# Patient Record
Sex: MALE | Race: WHITE | Employment: PART TIME | URBAN - METROPOLITAN AREA
[De-identification: names, ages, dates, MRNs, and addresses within clinical notes are randomized per-mention and may not be internally consistent; named-entity substitution may affect disease eponyms.]

---
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
## 2020-06-29 ENCOUNTER — VIRTUAL VISIT (OUTPATIENT)
Dept: FAMILY MEDICINE | Facility: CLINIC | Age: 20
End: 2020-06-29
Payer: COMMERCIAL

## 2020-06-29 DIAGNOSIS — F90.0 ATTENTION DEFICIT HYPERACTIVITY DISORDER (ADHD), PREDOMINANTLY INATTENTIVE TYPE: Primary | ICD-10-CM

## 2020-06-29 PROCEDURE — 99214 OFFICE O/P EST MOD 30 MIN: CPT | Mod: 95 | Performed by: FAMILY MEDICINE

## 2020-06-29 RX ORDER — DEXTROAMPHETAMINE SACCHARATE, AMPHETAMINE ASPARTATE, DEXTROAMPHETAMINE SULFATE AND AMPHETAMINE SULFATE 2.5; 2.5; 2.5; 2.5 MG/1; MG/1; MG/1; MG/1
10 TABLET ORAL 2 TIMES DAILY
Qty: 60 TABLET | Refills: 0 | Status: SHIPPED | OUTPATIENT
Start: 2020-08-30 | End: 2020-06-30

## 2020-06-29 RX ORDER — DEXTROAMPHETAMINE SACCHARATE, AMPHETAMINE ASPARTATE, DEXTROAMPHETAMINE SULFATE AND AMPHETAMINE SULFATE 2.5; 2.5; 2.5; 2.5 MG/1; MG/1; MG/1; MG/1
10 TABLET ORAL 2 TIMES DAILY
Qty: 60 TABLET | Refills: 0 | Status: SHIPPED | OUTPATIENT
Start: 2020-07-30 | End: 2020-06-30

## 2020-06-29 RX ORDER — DEXTROAMPHETAMINE SACCHARATE, AMPHETAMINE ASPARTATE, DEXTROAMPHETAMINE SULFATE AND AMPHETAMINE SULFATE 2.5; 2.5; 2.5; 2.5 MG/1; MG/1; MG/1; MG/1
10 TABLET ORAL 2 TIMES DAILY
Qty: 60 TABLET | Refills: 0 | Status: SHIPPED | OUTPATIENT
Start: 2020-06-29 | End: 2020-06-30

## 2020-06-29 NOTE — PROGRESS NOTES
"Deandre Guerrero is a 19 year old male who is being evaluated via a billable video visit.      The patient has been notified of following:     \"This video visit will be conducted via a call between you and your physician/provider. We have found that certain health care needs can be provided without the need for an in-person physical exam.  This service lets us provide the care you need with a video conversation.  If a prescription is necessary we can send it directly to your pharmacy.  If lab work is needed we can place an order for that and you can then stop by our lab to have the test done at a later time.    Video visits are billed at different rates depending on your insurance coverage.  Please reach out to your insurance provider with any questions.    If during the course of the call the physician/provider feels a video visit is not appropriate, you will not be charged for this service.\"    Patient has given verbal consent for Video visit? Yes  How would you like to obtain your AVS? [facility]  Patient would like the video invitation sent by: Text to cell phone: [phone]  Will anyone else be joining your video visit? No    Subjective     Deandre Guerrero is a 19 year old male who presents today via video visit for the following health issues:    HPI  Medication Followup of Adderall    Taking Medication as prescribed: yes    Side Effects:  None    Medication Helping Symptoms:  yes   History of attention deficit hyperactivity disorder- since childhood.  He is currently working constructions and boss noted him to be lacking in focus and completely one project in hand, and he was encouraged to call and get on adderall.  He reports he was prescribed adderall for many years and that helped a lot with school. 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  Noted that IR was probably better but its been a while.  The extended Release  Caused some decrease in appetite & affected sleep.  He denies any drugs or alcohol " abuse   Denies marijuana use.  Over all in usual  state of good health         Video Start Time: 322om    PROBLEMS TO ADD ON...    BP Readings from Last 3 Encounters:   12/30/18 117/66   06/22/18 123/65 (74 %, Z = 0.65 /  42 %, Z = -0.19)*   06/20/18 122/73 (69 %, Z = 0.51 /  71 %, Z = 0.56)*     *BP percentiles are based on the 2017 AAP Clinical Practice Guideline for boys    Wt Readings from Last 3 Encounters:   12/30/18 59.6 kg (131 lb 8 oz) (20 %, Z= -0.84)*   06/22/18 61.6 kg (135 lb 14.4 oz) (31 %, Z= -0.48)*   06/20/18 59 kg (130 lb) (22 %, Z= -0.79)*     * Growth percentiles are based on [facility] (Boys, 2-20 Years) data.                    Reviewed and updated as needed this visit by Provider  Tobacco  Meds         Review of Systems   Constitutional, HEENT, cardiovascular, pulmonary, GI, , musculoskeletal, neuro, skin, endocrine and psych systems are negative, except as otherwise noted.      Objective             Physical Exam     GENERAL: Healthy, alert and no distress  EYES: Eyes grossly normal to inspection.  No discharge or erythema, or obvious scleral/conjunctival abnormalities.  RESP: No audible wheeze, cough, or visible cyanosis.  No visible retractions or increased work of breathing.    SKIN: Visible skin clear. No significant rash, abnormal pigmentation or lesions.  NEURO: Cranial nerves grossly intact.  Mentation and speech appropriate for age.  PSYCH: Mentation appears normal, affect normal/bright, judgement and insight intact, normal speech and appearance well-groomed.      Diagnostic Test Results:  Labs reviewed in Epic  none         Assessment & Plan   20 yo male-    ICD-10-CM    1. Attention deficit hyperactivity disorder (ADHD), predominantly inattentive type  F90.0 amphetamine-dextroamphetamine (ADDERALL) 10 MG tablet     amphetamine-dextroamphetamine (ADDERALL) 10 MG tablet     amphetamine-dextroamphetamine (ADDERALL) 10 MG tablet     MNPMP- no concerns  Return office visit in 3  months  Earlier if concerns with medications- dose.  Random urine drug screen in future and controlled substance aggreement reviewed on phone but not signed    Potential medication side effects were discussed with the patient; let me know if any occur.      Return in about 3 months (around 9/29/2020) for mood, medications recheck/review/refill.    Eneida Sauceda MD  [facility]      Video-Visit Details    Type of service:  Video Visit    Video End Time:340 pm     Originating Location (pt. Location): Home    Distant Location (provider location):  [facility]     Platform used for Video Visit: Doximity    Return in about 3 months (around 9/29/2020) for mood, medications recheck/review/refill.       Eneida Sauceda MD

## 2020-06-30 ENCOUNTER — TELEPHONE (OUTPATIENT)
Dept: FAMILY MEDICINE | Facility: CLINIC | Age: 20
End: 2020-06-30

## 2020-06-30 DIAGNOSIS — F90.0 ATTENTION DEFICIT HYPERACTIVITY DISORDER (ADHD), PREDOMINANTLY INATTENTIVE TYPE: ICD-10-CM

## 2020-06-30 RX ORDER — DEXTROAMPHETAMINE SACCHARATE, AMPHETAMINE ASPARTATE, DEXTROAMPHETAMINE SULFATE AND AMPHETAMINE SULFATE 2.5; 2.5; 2.5; 2.5 MG/1; MG/1; MG/1; MG/1
10 TABLET ORAL 2 TIMES DAILY
Qty: 60 TABLET | Refills: 0 | Status: SHIPPED | OUTPATIENT
Start: 2020-06-30 | End: 2021-12-13

## 2020-06-30 RX ORDER — DEXTROAMPHETAMINE SACCHARATE, AMPHETAMINE ASPARTATE, DEXTROAMPHETAMINE SULFATE AND AMPHETAMINE SULFATE 2.5; 2.5; 2.5; 2.5 MG/1; MG/1; MG/1; MG/1
10 TABLET ORAL 2 TIMES DAILY
Qty: 60 TABLET | Refills: 0 | Status: SHIPPED | OUTPATIENT
Start: 2020-08-30 | End: 2021-12-13

## 2020-06-30 RX ORDER — DEXTROAMPHETAMINE SACCHARATE, AMPHETAMINE ASPARTATE, DEXTROAMPHETAMINE SULFATE AND AMPHETAMINE SULFATE 2.5; 2.5; 2.5; 2.5 MG/1; MG/1; MG/1; MG/1
10 TABLET ORAL 2 TIMES DAILY
Qty: 60 TABLET | Refills: 0 | Status: SHIPPED | OUTPATIENT
Start: 2020-07-30 | End: 2021-12-13

## 2020-06-30 NOTE — TELEPHONE ENCOUNTER
A.S,   See below  Adderall sent to [facility] yesterday  They don't accept pt's insurance  Pt wants Rx[facility] instead  Pended Rx's if you approve of change  Thanks,  Gretel CAN, RN

## 2020-06-30 NOTE — TELEPHONE ENCOUNTER
Reason for Call:  Medication or medication refill:    Do you use a [facility]?  Name of the pharmacy and phone number for the current request:     [facility] [address] [facility] [address]  Ph. [phone]    Name of the medication requested:   amphetamine-dextroamphetamine (ADDERALL) 10 MG tablet  60 tablet       Other request: Needs to change pharmacy this we initially sent to, as Swapna told him they don't accept his insurance- pt is currently out of this medication    Can we leave a detailed message on this number? YES    Phone number patient can be reached at: Cell number on file:    Telephone Information:   Mobile [phone]       Best Time: any    Call taken on 6/30/2020 at 6:03 PM by Melina Jerez

## 2020-08-25 ENCOUNTER — MYC REFILL (OUTPATIENT)
Dept: FAMILY MEDICINE | Facility: CLINIC | Age: 20
End: 2020-08-25

## 2020-08-25 DIAGNOSIS — F90.0 ATTENTION DEFICIT HYPERACTIVITY DISORDER (ADHD), PREDOMINANTLY INATTENTIVE TYPE: ICD-10-CM

## 2020-08-26 RX ORDER — DEXTROAMPHETAMINE SACCHARATE, AMPHETAMINE ASPARTATE, DEXTROAMPHETAMINE SULFATE AND AMPHETAMINE SULFATE 2.5; 2.5; 2.5; 2.5 MG/1; MG/1; MG/1; MG/1
10 TABLET ORAL 2 TIMES DAILY
Start: 2020-08-26

## 2020-08-26 NOTE — TELEPHONE ENCOUNTER
Adderall:   Duplicate.  Has Rx at pharmacy with start date 8/30/2020.  Patient informed via Raynat  Molly Borrero RN

## 2020-11-16 ENCOUNTER — HEALTH MAINTENANCE LETTER (OUTPATIENT)
Age: 20
End: 2020-11-16

## 2020-11-16 ENCOUNTER — MYC REFILL (OUTPATIENT)
Dept: FAMILY MEDICINE | Facility: CLINIC | Age: 20
End: 2020-11-16

## 2020-11-16 DIAGNOSIS — F90.0 ATTENTION DEFICIT HYPERACTIVITY DISORDER (ADHD), PREDOMINANTLY INATTENTIVE TYPE: ICD-10-CM

## 2020-11-16 RX ORDER — DEXTROAMPHETAMINE SACCHARATE, AMPHETAMINE ASPARTATE, DEXTROAMPHETAMINE SULFATE AND AMPHETAMINE SULFATE 2.5; 2.5; 2.5; 2.5 MG/1; MG/1; MG/1; MG/1
10 TABLET ORAL 2 TIMES DAILY
Qty: 60 TABLET | Refills: 0 | Status: CANCELLED | OUTPATIENT
Start: 2020-11-16

## 2020-12-02 ENCOUNTER — VIRTUAL VISIT (OUTPATIENT)
Dept: FAMILY MEDICINE | Facility: CLINIC | Age: 20
End: 2020-12-02
Payer: COMMERCIAL

## 2020-12-02 DIAGNOSIS — F90.0 ATTENTION DEFICIT HYPERACTIVITY DISORDER (ADHD), PREDOMINANTLY INATTENTIVE TYPE: ICD-10-CM

## 2020-12-02 DIAGNOSIS — F17.200 SMOKER: Primary | ICD-10-CM

## 2020-12-02 PROCEDURE — 99214 OFFICE O/P EST MOD 30 MIN: CPT | Mod: 95 | Performed by: FAMILY MEDICINE

## 2020-12-02 RX ORDER — DEXTROAMPHETAMINE SACCHARATE, AMPHETAMINE ASPARTATE, DEXTROAMPHETAMINE SULFATE AND AMPHETAMINE SULFATE 2.5; 2.5; 2.5; 2.5 MG/1; MG/1; MG/1; MG/1
10 TABLET ORAL 2 TIMES DAILY
Qty: 60 TABLET | Refills: 0 | Status: SHIPPED | OUTPATIENT
Start: 2021-02-02 | End: 2021-03-04

## 2020-12-02 RX ORDER — DEXTROAMPHETAMINE SACCHARATE, AMPHETAMINE ASPARTATE, DEXTROAMPHETAMINE SULFATE AND AMPHETAMINE SULFATE 2.5; 2.5; 2.5; 2.5 MG/1; MG/1; MG/1; MG/1
10 TABLET ORAL 2 TIMES DAILY
Qty: 60 TABLET | Refills: 0 | Status: SHIPPED | OUTPATIENT
Start: 2020-12-02 | End: 2021-01-01

## 2020-12-02 RX ORDER — DEXTROAMPHETAMINE SACCHARATE, AMPHETAMINE ASPARTATE, DEXTROAMPHETAMINE SULFATE AND AMPHETAMINE SULFATE 2.5; 2.5; 2.5; 2.5 MG/1; MG/1; MG/1; MG/1
10 TABLET ORAL 2 TIMES DAILY
Qty: 60 TABLET | Refills: 0 | Status: SHIPPED | OUTPATIENT
Start: 2021-01-02 | End: 2021-02-01

## 2020-12-02 NOTE — PROGRESS NOTES
"Deandre Guerrero is a 20 year old male who is being evaluated via a billable video visit.      The patient has been notified of following:     \"This video visit will be conducted via a call between you and your physician/provider. We have found that certain health care needs can be provided without the need for an in-person physical exam.  This service lets us provide the care you need with a video conversation.  If a prescription is necessary we can send it directly to your pharmacy.  If lab work is needed we can place an order for that and you can then stop by our lab to have the test done at a later time.    Video visits are billed at different rates depending on your insurance coverage.  Please reach out to your insurance provider with any questions.    If during the course of the call the physician/provider feels a video visit is not appropriate, you will not be charged for this service.\"    Patient has given verbal consent for Video visit? Yes  How would you like to obtain your AVS? MyChart  If you are dropped from the video visit, the video invite should be resent to: MyChart  Will anyone else be joining your video visit? No      Subjective     Deandre Guerrero is a 20 year old male who presents today via video visit for the following health issues:    HPI        Medication Followup of adderall     Taking Medication as prescribed: yes    Side Effects:  None    Medication Helping Symptoms:  Yes    Doing online school- just started for [employer]- very excited and enjoying    Study better, work done    Prevents easy distraction, gets work done      ADHD Follow-Up    Date of last ADHD office visit: 6/29/2020  Status since last visit: Improving   Taking controlled (daily) medications as prescribed: Yes                       Parent/Patient Concerns with Medications: None  ADHD Medication     Amphetamines Disp Start End     amphetamine-dextroamphetamine (ADDERALL) 10 MG tablet    60 tablet 6/30/2020     Sig - " "Route: Take 1 tablet (10 mg) by mouth 2 times daily - Oral    Class: E-Prescribe    Earliest Fill Date: 6/30/2020     amphetamine-dextroamphetamine (ADDERALL) 10 MG tablet    60 tablet 7/30/2020     Sig - Route: Take 1 tablet (10 mg) by mouth 2 times daily - Oral    Class: E-Prescribe    Earliest Fill Date: 7/30/2020     amphetamine-dextroamphetamine (ADDERALL) 10 MG tablet    60 tablet 8/30/2020     Sig - Route: Take 1 tablet (10 mg) by mouth 2 times daily - Oral    Class: E-Prescribe    Earliest Fill Date: 8/30/2020        Medication Benefits:   Controlled symptoms: Attention span, Distractability, Finishing tasks, Impulse control and School failure  Uncontrolled Symptoms: None    Medication side effects:  Side effects noted: none  Denies: appetite suppression, weight loss, insomnia, tics, palpitations, stomach ache, headache, emotional lability, rebound irritability, drowsiness, \"zombie\" effect, growth suppression and dry mouth     Video Start Time: 3:30 PM        Review of Systems   Constitutional, HEENT, cardiovascular, pulmonary, GI, , musculoskeletal, neuro, skin, endocrine and psych systems are negative, except as otherwise noted.      Objective           Vitals:  No vitals were obtained today due to virtual visit.    Physical Exam     GENERAL: Healthy, alert and no distress  EYES: Eyes grossly normal to inspection.  No discharge or erythema, or obvious scleral/conjunctival abnormalities.  RESP: No audible wheeze, cough, or visible cyanosis.  No visible retractions or increased work of breathing.    SKIN: Visible skin clear. No significant rash, abnormal pigmentation or lesions.  NEURO: Cranial nerves grossly intact.  Mentation and speech appropriate for age.  PSYCH: Mentation appears normal, affect normal/bright, judgement and insight intact, normal speech and appearance well-groomed.              Assessment & Plan     Deandre was seen today for recheck medication and a.d.h.d.    Diagnoses and all orders " for this visit:        Attention deficit hyperactivity disorder (ADHD), predominantly inattentive type   no concerns.  Medication refill for next 3 months.  Next follow-up can be a telephone only appointment and then following video virtual visit or return office visit.    -     amphetamine-dextroamphetamine (ADDERALL) 10 MG tablet; Take 1 tablet (10 mg) by mouth 2 times daily  -     amphetamine-dextroamphetamine (ADDERALL) 10 MG tablet; Take 1 tablet (10 mg) by mouth 2 times daily  -     amphetamine-dextroamphetamine (ADDERALL) 10 MG tablet; Take 1 tablet (10 mg) by mouth 2 times daily         Tobacco Cessation:Smoker   reports that he has been smoking cigarettes. He has never used smokeless tobacco.  Tobacco Cessation Action Plan: Information offered: Patient not interested at this time         9    Return in about 3 months (around 3/2/2021) for concerns,unresolved, virtual/video visit, medications recheck/review/refill.    Eneida Sauceda MD  [facility]      Video-Visit Details    Type of service:  Video Visit    Video End Time:3:41 PM    Originating Location (pt. Location): Home    Distant Location (provider location):  [facility]     Platform used for Video Visit: August

## 2021-01-25 ENCOUNTER — MYC REFILL (OUTPATIENT)
Dept: FAMILY MEDICINE | Facility: CLINIC | Age: 21
End: 2021-01-25

## 2021-01-25 DIAGNOSIS — F90.0 ATTENTION DEFICIT HYPERACTIVITY DISORDER (ADHD), PREDOMINANTLY INATTENTIVE TYPE: ICD-10-CM

## 2021-01-25 RX ORDER — DEXTROAMPHETAMINE SACCHARATE, AMPHETAMINE ASPARTATE, DEXTROAMPHETAMINE SULFATE AND AMPHETAMINE SULFATE 2.5; 2.5; 2.5; 2.5 MG/1; MG/1; MG/1; MG/1
10 TABLET ORAL 2 TIMES DAILY
Qty: 60 TABLET | Refills: 0 | Status: CANCELLED | OUTPATIENT
Start: 2021-01-25

## 2021-02-07 ENCOUNTER — MYC MEDICAL ADVICE (OUTPATIENT)
Dept: FAMILY MEDICINE | Facility: CLINIC | Age: 21
End: 2021-02-07

## 2021-02-07 ENCOUNTER — MYC REFILL (OUTPATIENT)
Dept: FAMILY MEDICINE | Facility: CLINIC | Age: 21
End: 2021-02-07

## 2021-02-07 DIAGNOSIS — F90.0 ATTENTION DEFICIT HYPERACTIVITY DISORDER (ADHD), PREDOMINANTLY INATTENTIVE TYPE: ICD-10-CM

## 2021-02-07 RX ORDER — DEXTROAMPHETAMINE SACCHARATE, AMPHETAMINE ASPARTATE, DEXTROAMPHETAMINE SULFATE AND AMPHETAMINE SULFATE 2.5; 2.5; 2.5; 2.5 MG/1; MG/1; MG/1; MG/1
10 TABLET ORAL 2 TIMES DAILY
Qty: 60 TABLET | Refills: 0 | Status: CANCELLED | OUTPATIENT
Start: 2021-02-07

## 2021-09-18 ENCOUNTER — HEALTH MAINTENANCE LETTER (OUTPATIENT)
Age: 21
End: 2021-09-18

## 2021-12-09 NOTE — PROGRESS NOTES
Deandre is a 21 year old who is being evaluated via a billable video visit.      How would you like to obtain your AVS? Sarthak  If the video visit is dropped, the invitation should be resent by: Sarthak or else Text to cell phone: [phone]  Will anyone else be joining your video visit? No      Video Start Time: 4:52 PM    Assessment & Plan   21 year old male  With known history of attention deficit hyperactivity disorder.  Has not used adderal because has been smoking marijuana and unable to give clean urine- because feels that marijuana relaxes and denies anxiety or depression & would like to resume adderall because new exciting job- needs more focus.       Cannabis  & tobacco dependence (H)  Advised to stop marijuana  Unwilling to give U.tox because of that   - Adult Mental Health Referral; Future    Attention deficit hyperactivity disorder (ADHD), predominantly inattentive type  -Clear guidance - refferal choices given  - Adult Mental Health Referral; Future  - no adderall prescribed.           Tobacco Cessation:   reports that he has been smoking cigarettes. He has a 0.50 pack-year smoking history. He has never used smokeless tobacco.  Tobacco Cessation Action Plan: Information offered: Patient not interested at this time        No follow-ups on file.    Eneida Sauceda MD  [facility]    Juan Ramon Carrera is a 21 year old who presents for the following health issues     History of Present Illness       He eats 0-1 servings of fruits and vegetables daily.He consumes 3 sweetened beverage(s) daily.He exercises with enough effort to increase his heart rate 60 or more minutes per day.  He exercises with enough effort to increase his heart rate 5 days per week. He is missing 7 dose(s) of medications per week.  He is not taking prescribed medications regularly due to other.       ADHD Follow-Up    Date of last ADHD office visit: 12/02/2020  Status since last visit: would like to  "discuss  Taking controlled (daily) medications as prescribed: No                       Parent/Patient Concerns with Medications: would like to discuss  ADHD Medication     Amphetamines Disp Start End     amphetamine-dextroamphetamine (ADDERALL) 10 MG tablet    60 tablet 6/30/2020     Sig - Route: Take 1 tablet (10 mg) by mouth 2 times daily - Oral    Class: E-Prescribe    Earliest Fill Date: 6/30/2020     amphetamine-dextroamphetamine (ADDERALL) 10 MG tablet    60 tablet 7/30/2020     Sig - Route: Take 1 tablet (10 mg) by mouth 2 times daily - Oral    Class: E-Prescribe    Earliest Fill Date: 7/30/2020     amphetamine-dextroamphetamine (ADDERALL) 10 MG tablet    60 tablet 8/30/2020     Sig - Route: Take 1 tablet (10 mg) by mouth 2 times daily - Oral    Class: E-Prescribe    Earliest Fill Date: 8/30/2020      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  Pays well but difficult job  Has smoked marijuana    Medication Benefits:   Controlled symptoms: Attention span, Distractability, Finishing tasks and Impulse control  Uncontrolled Symptoms: None    Medication side effects:  Side effects noted: none  Denies: appetite suppression, weight loss, insomnia, tics, palpitations, stomach ache, headache, emotional lability, rebound irritability, drowsiness, \"zombie\" effect, growth suppression and dry mouth            Review of Systems   Constitutional, HEENT, cardiovascular, pulmonary, GI, , musculoskeletal, neuro, skin, endocrine and psych systems are negative, except as otherwise noted.      Objective           Vitals:  No vitals were obtained today due to virtual visit.    Physical Exam   GENERAL: Healthy, alert and no distress  EYES: Eyes grossly normal to inspection.  No discharge or erythema, or obvious scleral/conjunctival abnormalities.  RESP: No audible wheeze, cough, or visible cyanosis.  No visible retractions or increased work of breathing.    SKIN: Visible skin clear. No " significant rash, abnormal pigmentation or lesions.  NEURO: Cranial nerves grossly intact.  Mentation and speech appropriate for age.  PSYCH: Mentation appears normal, affect normal/bright, judgement and insight intact, normal speech and appearance well-groomed.                Video-Visit Details    Type of service:  Video Visit    Video End Time:5:04 PM    Originating Location (pt. Location): Home    Distant Location (provider location):  [facility]     Platform used for Video Visit: [employer]

## 2021-12-13 ENCOUNTER — VIRTUAL VISIT (OUTPATIENT)
Dept: FAMILY MEDICINE | Facility: CLINIC | Age: 21
End: 2021-12-13

## 2021-12-13 DIAGNOSIS — F12.20 CANNABIS DEPENDENCE (H): Primary | ICD-10-CM

## 2021-12-13 DIAGNOSIS — F90.0 ATTENTION DEFICIT HYPERACTIVITY DISORDER (ADHD), PREDOMINANTLY INATTENTIVE TYPE: ICD-10-CM

## 2021-12-13 PROCEDURE — 99213 OFFICE O/P EST LOW 20 MIN: CPT | Mod: 95 | Performed by: FAMILY MEDICINE

## 2022-01-08 ENCOUNTER — HEALTH MAINTENANCE LETTER (OUTPATIENT)
Age: 22
End: 2022-01-08

## 2022-11-20 ENCOUNTER — HEALTH MAINTENANCE LETTER (OUTPATIENT)
Age: 22
End: 2022-11-20

## 2023-04-15 ENCOUNTER — HEALTH MAINTENANCE LETTER (OUTPATIENT)
Age: 23
End: 2023-04-15

## 2024-06-16 ENCOUNTER — HEALTH MAINTENANCE LETTER (OUTPATIENT)
Age: 24
End: 2024-06-16

## 2024-12-21 NOTE — ED PROVIDER NOTES
"  History     Chief Complaint:  Abdominal Pain    HPI   Deandre Guerrero is a 17 year old male who presents with abdominal pain.  Patient states that he woke up this morning with abdominal pain approximately 2 hours prior to arrival.  Since onset of pain, patient states that he has felt constipated and has tried to vomit in order to relieve his symptoms, but has not had any success.  He notes that this pain is generalized across his abdomen.  Patient also makes note of some other somewhat chronic symptoms occurring, including intermittent weakness/fatigue over the past year as well as a spot of hard skin on his right testicle of unsure origin that is been present for the past 5 days.  He denies any fever, testicular pain/swelling, urinary symptoms, cough, sore throat or any other associated symptoms.  Of note, patient is a recurrent marijuana smoker and frequently experiences some nausea and vomiting secondary to this.    Allergies:  Seasonal Allergies   No known drug allergies.    Medications:    Adderall  Zyrtec  Flonase     Past Medical History:    Allergies    Past Surgical History:    No pertinent past surgical history.    Family History:    Mother-allergies, HA  Grandfather-hypertension, cerebrovascular disease, allergies    Social History:  Smoking status: Current smoker (marijuana)  Alcohol use: No  Marital Status:  Single    PCP: Zan Izaguirre    Review of Systems   Constitutional: Negative for fever.   Gastrointestinal: Positive for abdominal pain, constipation and nausea. Negative for blood in stool, diarrhea and vomiting.   Neurological: Negative for dizziness, weakness and headaches.   All other systems reviewed and are negative.    Physical Exam     Patient Vitals for the past 24 hrs:   BP Temp Temp src Heart Rate Resp SpO2 Height Weight   06/20/18 0412 120/67 97.7  F (36.5  C) Temporal 57 20 100 % 1.676 m (5' 6\") 59 kg (130 lb)         Physical Exam  Nursing note and vitals " Discussed discharge summary with patient and patient's spouse. Instructed about medications & follow up appointments. Patient denies any additional questions. Patient was discharged with all belongings, with 7 day event monitor in place, and with [facility]. No distress noted. Patient discharged to home. Taken down to the vehicle per wheelchair.      reviewed.  Constitutional:  Appears well-developed and well-nourished.   HENT:   Head:    Atraumatic.   Mouth/Throat:   Oropharynx is clear and moist. No oropharyngeal exudate.   Eyes:    Pupils are equal, round, and reactive to light.   Neck:    Normal range of motion. Neck supple.      No tracheal deviation present. No thyromegaly present.   Cardiovascular:  Normal rate, regular rhythm, no murmur   Pulmonary/Chest: Breath sounds are clear and equal without wheezes or crackles.  Abdominal:   Soft. Bowel sounds are normal. Exhibits no distension and      no mass. Diffuse abdominal tenderness with guarding but no rebound  :    Testicles non-tender without swelling, no palpable hernia. A small subcutaneous, non-tender lesion to right inguinal area which measures [id number] and is mobile.   Musculoskeletal:  Exhibits no edema.   Lymphadenopathy:  No cervical adenopathy.   Neurological:   Alert and oriented to person, place, and time.   Skin:    Skin is warm and dry. No rash noted. No pallor.     Emergency Department Course   Imaging:  Abdomen/Pelvis CT with IV contrast:  Pending  Report per radiology.      Laboratory:  CBC:  WBC 8.3, HGB 15.6, , otherwise WNL  BMP: glucose 124, otherwise WNL (Creatinine 0.98)  Lipase: 105  Hepatic panel: Normal  UA: Clear yellow urine, negative.    Mononucleosis screen: neg    Interventions:  Medications   iohexol (OMNIPAQUE) solution 25 mL (25 mLs Oral Given 6/20/18 0523)   morphine (PF) injection 4 mg (4 mg Intravenous Given 6/20/18 0445)   0.9% sodium chloride BOLUS (0 mLs Intravenous Stopped 6/20/18 0534)   morphine (PF) injection 4 mg (4 mg Intravenous Given 6/20/18 0534)   iopamidol (ISOVUE-370) solution 65 mL (65 mLs Intravenous Given 6/20/18 0602)   Saline flush (60 mLs Intravenous Given 6/20/18 0602)         Emergency Department Course:  Nursing notes and vitals reviewed.  ([id number]) I performed an exam of the patient as documented above.    The patient was sent for a CT  scan while in the emergency department, findings above.   Blood was drawn from the patient. This was sent for laboratory testing, findings above.   Urine sample was obtained and sent for laboratory analysis, findings above.    The patient was signed out to Dr. Crump to follow-up on CT scan results and appropriate disposition.     Impression & Plan    Medical Decision Making:  Acute severe generalized abdominal pain in a healthy male who has had intermittent minor stomach issues with constipation over the past 1 year.  His pain is concerning for possible Appendicitis, so CT scan is pending.  Urine and blood work negative.  He was signed out to Dr. Crump, pending CT scan results and appropriate disposition.  His pain is being controlled with IV Morphine.    Diagnosis:    ICD-10-CM    1. Abdominal pain, generalized R10.84        Disposition:  pending    Discharge Medications:  New Prescriptions    No medications on file         IRiaz am serving as a scribe on 6/20/2018 at 4:34 AM to personally document services performed by Dr. Van based on my observations and the provider's statements to me.            Riaz Hicks  6/20/2018    EMERGENCY DEPARTMENT       Gifty Van MD  06/20/18 0619